# Patient Record
Sex: MALE | Race: NATIVE HAWAIIAN OR OTHER PACIFIC ISLANDER
[De-identification: names, ages, dates, MRNs, and addresses within clinical notes are randomized per-mention and may not be internally consistent; named-entity substitution may affect disease eponyms.]

---

## 2022-10-13 ENCOUNTER — HOSPITAL ENCOUNTER (EMERGENCY)
Dept: HOSPITAL 47 - EC | Age: 47
Discharge: HOME | End: 2022-10-13
Payer: COMMERCIAL

## 2022-10-13 VITALS — HEART RATE: 79 BPM | RESPIRATION RATE: 20 BRPM | DIASTOLIC BLOOD PRESSURE: 87 MMHG | SYSTOLIC BLOOD PRESSURE: 144 MMHG

## 2022-10-13 VITALS — TEMPERATURE: 98 F

## 2022-10-13 DIAGNOSIS — F17.200: ICD-10-CM

## 2022-10-13 DIAGNOSIS — B02.9: Primary | ICD-10-CM

## 2022-10-13 DIAGNOSIS — G62.9: ICD-10-CM

## 2022-10-13 PROCEDURE — 99283 EMERGENCY DEPT VISIT LOW MDM: CPT

## 2022-10-13 PROCEDURE — 96372 THER/PROPH/DIAG INJ SC/IM: CPT

## 2022-10-13 NOTE — ED
General Adult HPI





- General


Chief complaint: Skin/Abscess/Foreign Body


Stated complaint: Abscess


Time Seen by Provider: 10/13/22 07:15


Source: patient


Mode of arrival: ambulatory


Limitations: no limitations





- History of Present Illness


Initial comments: 





47-year-old male presents emergency room with rash to left side of face, 

headache and malaise.  States that the rash has been present for the past 5 

days.  The rash consists of several clustered blisters.  Admits to headache 

without visual changes.  No ear pain.  No sore throat.  No hearing changes a

dmits to nausea without vomiting.  No speech deficits.  No facial droop.  Does 

have a history of chickenpox as a child.  No alleviating, precipitating or 

modifying factors





- Related Data


                                  Previous Rx's











 Medication  Instructions  Recorded


 


Amoxic-Pot Clav 875-125Mg 1 tab PO Q12HR 10 Days #20 tab 06/10/22





[Augmentin 875-125]  


 


HYDROcodone/APAP 5-325MG [Norco 1 each PO Q4HR PRN 3 Days #18 tab 06/10/22





5-325]  


 


HYDROcodone/APAP 7.5-325MG [Norco 1 tab PO Q4HR PRN 3 Days #18 tab 10/13/22





7.5-325]  


 


Lidocaine 5% Patch [Lidoderm] 1 patch TOPICAL DAILY #24 patch 10/13/22


 


valACYclovir HCL [Valacyclovir] 1,000 mg PO TID #21 tab 10/13/22











                                    Allergies











Allergy/AdvReac Type Severity Reaction Status Date / Time


 


No Known Allergies Allergy   Verified 10/13/22 06:37














Review of Systems


ROS Statement: 


Those systems with pertinent positive or pertinent negative responses have been 

documented in the HPI.





ROS Other: All systems not noted in ROS Statement are negative.





Past Medical History


Past Medical History: No Reported History


History of Any Multi-Drug Resistant Organisms: None Reported, MRSA


Date of last positivie culture/infection: thigh


MDRO Source:: 2010


Past Surgical History: No Surgical Hx Reported


Past Psychological History: No Psychological Hx Reported


Smoking Status: Current every day smoker


Past Alcohol Use History: None Reported


Past Drug Use History: Cocaine, Heroin, IV Drug Use, Marijuana, Methamphetamine,

 Opiates, Prescription Drug Abuse





- Past Family History


  ** Father


Family Medical History: Cancer





General Exam


Limitations: no limitations


General appearance: alert, in no apparent distress


Head exam: Present: atraumatic, normocephalic, other (Blistery rash left side of

 face in V3 distribution.  No internal ear lesions.  No signs of [place])


Eye exam: Present: normal appearance, PERRL, EOMI.  Absent: scleral icterus, 

conjunctival injection, periorbital swelling


ENT exam: Present: normal exam, mucous membranes moist


Neck exam: Present: normal inspection.  Absent: tenderness, meningismus, 

lymphadenopathy


Respiratory exam: Present: normal lung sounds bilaterally.  Absent: respiratory 

distress, wheezes, rales, rhonchi, stridor


Cardiovascular Exam: Present: regular rate, normal rhythm, normal heart sounds. 

 Absent: systolic murmur, diastolic murmur, rubs, gallop, clicks


GI/Abdominal exam: Present: soft, normal bowel sounds.  Absent: distended, 

tenderness, guarding, rebound, rigid


Extremities exam: Present: normal inspection, full ROM, normal capillary refill.

  Absent: tenderness, pedal edema, joint swelling, calf tenderness


Back exam: Present: normal inspection


Neurological exam: Present: alert, oriented X3, CN II-XII intact


Psychiatric exam: Present: normal affect, normal mood


Skin exam: Present: warm, dry, intact, normal color.  Absent: rash





Course


                                   Vital Signs











  10/13/22 10/13/22





  06:34 08:38


 


Temperature 98 F 


 


Pulse Rate 67 79


 


Respiratory 18 20





Rate  


 


Blood Pressure 151/96 144/87


 


O2 Sat by Pulse 100 99





Oximetry  














Medical Decision Making





- Medical Decision Making





Upon arrival patient was placed into [facility].  Physical exam was performed.  Patient

 does have rash consistent with shingles to the left neck.  One lesion notable 

to the left external ear.  No lesions within the ear canal.  No facial droop or 

signs of [place].  Patient given a dose of Toradol and valacyclovir.  

Patient will be discharged home on Lidoderm patches, valacyclovir Norco.  

Injected take the medications as directed.  As he is not suffering from any 

facial droop patient is not initiated on steroids.  Patient will be discharged 

home and instructed felt his primary care doctor to 4 days.  Return for any new 

or worsening symptoms.  Patient was reviewed with the plan and discharged home 

in stable condition





Disposition


Clinical Impression: 


 Shingles (herpes zoster) polyneuropathy





Disposition: HOME SELF-CARE


Condition: Stable


Instructions (If sedation given, give patient instructions):  Shingles (ED)


Additional Instructions: 


Please take the pain medications as directed.  Follow-up with your doctor in 2-4

 days and return for any new or worsening symptoms


Prescriptions: 


Lidocaine 5% Patch [Lidoderm] 1 patch TOPICAL DAILY #24 patch


HYDROcodone/APAP 7.5-325MG [Norco 7.5-325] 1 tab PO Q4HR PRN 3 Days #18 tab


 PRN Reason: Pain


valACYclovir HCL [Valacyclovir] 1,000 mg PO TID #21 tab


Is patient prescribed a controlled substance at d/c from ED?: Yes


When asked, does pt state using other controlled substances?: No


If prescribed controlled substance>3 days was MAPS reviewed?: Prescribed <3 Days


Referrals: 


None,Stated [Primary Care Provider] - 1-2 days


Time of Disposition: 08:20

## 2024-10-23 NOTE — XR
EXAMINATION TYPE: XR hand complete RT

 

DATE OF EXAM: 10/23/2024

 

CLINICAL HISTORY: pain

 

TECHNIQUE:  Frontal, lateral and oblique images of the right hand are obtained.

 

COMPARISON: None.

 

FINDINGS:  There is no acute fracture/dislocation evident. The joint spaces appear within normal limi
ts.  The overlying soft tissue appears unremarkable.

 

IMPRESSION: 

 

There is no acute fracture or dislocation

 

ICD 10 NO FRACTURE, INITIAL EVALUATION

 

[facility], Workstation: [id number], 10/23/2024 2:17 PM

## 2024-10-23 NOTE — ED
General Adult HPI





- General


Chief complaint: Extremity Injury, Upper


Stated complaint: R hand pain


Source: patient


Mode of arrival: ambulatory


Limitations: no limitations





- History of Present Illness


Initial comments: 


Dictation was produced using dragon dictation software. please excuse any 

grammatical, word or spelling errors. 











Chief Complaint: 49-year-old male presents to the emergency department right 

hand pain





History of Present Illness: Patient 49-year-old male who states for the last 2 

days she has had right hand pain.  States most of his pain is to the dorsum of 

his knuckles.  States he was working on his camper recently.  Does not recall 

any specific event where he hit his hand.  Denies his hand being crushed.  

Denies any fever, chills or night sweats.  Pain does not radiate to his forearm 

or armpit.








The ROS documented in this emergency department record has been reviewed and 

confirmed by me.  Those systems with pertinent positive or negative responses ha

ve been documented in the HPI.  All other systems are other negative and/or 

noncontributory.




















- Related Data


                                  Previous Rx's











 Medication  Instructions  Recorded


 


Amoxic-Pot Clav 875-125Mg 1 tab PO Q12HR 10 Days #20 tab 06/10/22





[Augmentin 875-125]  


 


HYDROcodone/APAP 5-325MG [Norco 1 each PO Q4HR PRN 3 Days #18 tab 06/10/22





5-325]  


 


HYDROcodone/APAP 7.5-325MG [Norco 1 tab PO Q4HR PRN 3 Days #18 tab 10/13/22





7.5-325]  


 


Lidocaine 5% Patch [Lidoderm] 1 patch TOPICAL DAILY #24 patch 10/13/22


 


valACYclovir HCL [Valacyclovir] 1,000 mg PO TID #21 tab 10/13/22


 


Cephalexin [Keflex] 500 mg PO Q6HR 5 Days #20 cap 10/23/24


 


oxyCODONE HCL/ACETAMINOPHEN 1 tab PO Q6HR PRN 3 Days #12 tab 10/23/24





[Percocet 5-325 mg]  











                                    Allergies











Allergy/AdvReac Type Severity Reaction Status Date / Time


 


No Known Allergies Allergy   Verified 10/13/22 06:37














Review of Systems


ROS Statement: 


Those systems with pertinent positive or pertinent negative responses have been 

documented in the HPI.





ROS Other: All systems not noted in ROS Statement are negative.





Past Medical History


Past Medical History: No Reported History


History of Any Multi-Drug Resistant Organisms: None Reported, MRSA


Date of last positivie culture/infection: thigh


MDRO Source:: 2010


Past Surgical History: No Surgical Hx Reported


Additional Past Surgical History / Comment(s): cyst removal from back


Past Psychological History: No Psychological Hx Reported


Smoking Status: Vaper


Past Alcohol Use History: None Reported


Past Drug Use History: Cocaine, Heroin, IV Drug Use, Marijuana, Methamphetamine,

Opiates, Prescription Drug Abuse





- Past Family History


  ** Father


Family Medical History: Cancer





General Exam





- General Exam Comments


Initial Comments: 











General: Well-appearing, nontoxic, no acute distress.


Head: Normocephalic, atraumatic


Eyes: PERRLA, EOMI


ENT: Airway patent


Chest: Nonlabored breathing


Skin: No visual rash, normal skin tone


Neuro: Alert and oriented 3


Musculoskeletal: No gross abnormalities


Right hand: Pain over the MCP joints and DIP joints with some erythema.  No skin

breaks.  No lymphadenopathy to the inner bicep area or armpit





Limitations: no limitations





Course


                                   Vital Signs











  10/23/24





  13:11


 


Temperature 97.7 F


 


Pulse Rate 82


 


Respiratory 18





Rate 


 


Blood Pressure 131/83


 


O2 Sat by Pulse 99





Oximetry 














Medical Decision Making





- Medical Decision Making


Was pt. sent in by a medical professional or institution (, PA, NP, urgent 

care, hospital, or nursing home...) When possible be specific


@  -No


Did you speak to anyone other than the patient for history (EMS, parent, family,

police, friend...)? What history was obtained from this source 


@  -No


Did you review nursing and triage notes (agree or disagree)?  Why? 


@  -I reviewed and agree with nursing and triage notes


Were old charts reviewed (outside hosp., previous admission, EMS record, old 

EKG, old radiological studies, urgent care reports/EKG's, nursing home records)?

Report findings 


@  -No old charts were reviewed


Differential Diagnosis (chest pain, altered mental status, abdominal pain women,

abdominal pain men, vaginal bleeding, musculoskeletal, weakness, fever, dyspnea,

syncope, headache, dizziness, GI bleed, back pain, seizure, CVA, palpatations, 

mental health)? 


@  -Flexor tenosynovitis, cellulitis, contusion


EKG interpreted by me (3pts min.).


@  -None done


X-rays interpreted by me (1pt min.).


@  -Hand x-ray shows no acute processes


CT interpreted by me (1pt min.).


@  -None done


U/S interpreted by me (1pt. min.).


@  -None done


What testing was considered but not performed or refused? (CT, X-rays, U/S, 

labs)? Why?


@  -None


What meds were considered but not given or refused? Why?


@  -None


Was smoking cessation discussed for >3mins.?


@  -No


Were there social determinants of health that impacted care today? How? 

(Homelessness, low income, unemployed, alcoholism, drug addiction, 

transportation, low edu. Level, literacy, decrease access to med. care, intermediate, 

rehab)?


@  -No


Was there de-escalation of care discussed even if they declined (Discuss DNR or 

withdrawal of care, Hospice)? DNR status


@  -No


What co-morbidities impacted this encounter? (DM, HTN, Smoking, COPD, CAD, 

Cancer, CVA, ARF, Chemo, Hep., AIDS, mental health diagnosis, sleep apnea, 

morbid obesity)?


@  -None


Was patient admitted / discharged? Hospital course, mention meds given and 

route, prescriptions, significant lab abnormalities, going to OR and other 

pertinent info.


@  -49-year-old male presents to the emergency department with atraumatic hand 

pain.  He did however recently work on his camper.  Patient complains of pain 

mostly in the lateral hand.  No erythema.  Not warm to touch.  No puncture 

wounds.  X-rays unremarkable.  Patient given analgesics.  Patient states that 

his pain is rather significant.  This point there is no objective findings to 

suggest that there is an infection however given his pain and that he did 

reportedly have some splinters while he was working on his camper he was covered

with antibiotics.  There is concern that perhaps there is an infection.  Is 

given strict return precautions otherwise given outpatient referral to hand 

surgery.


Did you discuss the management of the patient with other professionals 

(professionals i.e. , PA, NP, lab, RT, psych nurse, , , 

teacher, , )? Give summary


@  -No


Was critical care preformed (if so, how long)?


@  -No


Undiagnosed new problem with uncertain prognosis?


@  -No


Drug Therapy requiring intensive monitoring for toxicity (Heparin, Nitro, 

Insulin, Cardizem)?


@  -No


Were any procedures done?


@  -No


Diagnosis/symptom?  Acute, or Chronic, or Acute on Chronic?  Uncomplicated 

(without systemic symptoms) or Complicated (systemic symptoms)?


@  -Hand pain, no obvious source


Side effects of treatment?


@  -No


Exacerbation, Progression, or Severe Exacerbation?


@  -No


Poses a threat to life or bodily function? How? (Chest pain, USA, MI, pneumonia,

PE, COPD, DKA, ARF, appy, cholecystitis, CVA, Diverticulitis, Homicidal, 

Suicidal, threat to staff... and all critical care pts)


@  -No











Disposition


Clinical Impression: 


 Hand pain





Disposition: HOME SELF-CARE


Condition: Fair


Instructions (If sedation given, give patient instructions):  Arthralgia (ED)


Additional Instructions: 


Please seek immediate medical attention especially worsening pain, erythema to 

the hand or fever chills night sweats.  There is some concern that perhaps you 

are experiencing early symptoms of infection.  Otherwise follow-up with hand 

specialist.


Prescriptions: 


Cephalexin [Keflex] 500 mg PO Q6HR 5 Days #20 cap


oxyCODONE HCL/ACETAMINOPHEN [Percocet 5-325 mg] 1 tab PO Q6HR PRN 3 Days #12 tab


 PRN Reason: Pain


Is patient prescribed a controlled substance at d/c from ED?: Yes


If prescribed controlled substance>3 days was MAPS reviewed?: Prescribed <3 Days


Referrals: 


Kendra Gonzalez DO [Doctor of Osteopathic Medicine] - 1-2 days


Time of Disposition: 14:34